# Patient Record
Sex: FEMALE
[De-identification: names, ages, dates, MRNs, and addresses within clinical notes are randomized per-mention and may not be internally consistent; named-entity substitution may affect disease eponyms.]

---

## 2017-03-03 RX ORDER — DESOGESTREL AND ETHINYL ESTRADIOL 0.15-0.03
KIT ORAL
Qty: 28 TABLET | Refills: 10 | Status: SHIPPED | OUTPATIENT
Start: 2017-03-03 | End: 2017-04-10

## 2017-04-10 RX ORDER — DESOGESTREL AND ETHINYL ESTRADIOL 0.15-0.03
KIT ORAL
Qty: 28 TABLET | Refills: 10 | Status: SHIPPED | OUTPATIENT
Start: 2017-04-10 | End: 2018-01-08

## 2017-05-09 NOTE — TELEPHONE ENCOUNTER
No future appointments. Due in May. LOV 11/16 recheck in 6 months. LAST LAB None    LAST RX   Sertraline HCl 100 MG Oral Tab 90 tablet 1 11/10/2016     Left message for patient to call back for an appointment.  When appointment made can give Yolanda 30

## 2017-05-10 NOTE — TELEPHONE ENCOUNTER
Future Appointments  Date Time Provider Kate Soliman   6/7/2017 9:00 AM Priscila Trujillo MD [facility] [address]         Pending Prescriptions Disp Refills    SERTRALINE  MG Oral Tab [Pharmacy Med Name: SERTRALINE 100MG TABLETS] 30 tablet 0

## 2017-05-17 RX ORDER — SERTRALINE HYDROCHLORIDE 100 MG/1
TABLET, FILM COATED ORAL
Qty: 30 TABLET | Refills: 0 | Status: SHIPPED | OUTPATIENT
Start: 2017-05-17 | End: 2017-06-16

## 2017-05-17 NOTE — TELEPHONE ENCOUNTER
Pt already has appt.     Future Appointments  Date Time Provider Kate Soliman   6/7/2017 9:00 AM Clay Rubi MD [facility] [address]

## 2017-06-13 RX ORDER — SERTRALINE HYDROCHLORIDE 100 MG/1
TABLET, FILM COATED ORAL
Qty: 30 TABLET | Refills: 0 | OUTPATIENT
Start: 2017-06-13

## 2017-06-13 NOTE — TELEPHONE ENCOUNTER
Last OV 11/10/16  No labs done    Last Rx 5/08/17 #30    Pt states she has enough to get to appt    Future Appointments  Date Time Provider Kate Soliman   6/16/2017 2:00 PM Amada Mascorro MD [facility] [address]

## 2017-06-16 NOTE — PROGRESS NOTES
Liliana Valdovinos IS A 32year old female [address] [facility] Patient presents with:  Medication Request: Sertraline        History of present illness:     Feels anxiety is stable. Left a short-term position to take new job after 3-4 weeks.      Temporary insurance now, new History   Marital Status: Single  Spouse Name: N/A    Years of Education: N/A  Number of Children: N/A     Occupational History    [employer]     Social History Main Topics   Smoking status: Never Smoker     Smokeless tobacco: Never Used    [employer]

## 2017-06-16 NOTE — PATIENT INSTRUCTIONS
Congrats on new job! 1 month sertraline ordered now, will extend for another 5-6 months once you call with details on health plan and location we can send rx. Recheck 6 months (late Dec, early January).

## 2017-07-05 ENCOUNTER — CHARTING TRANS (OUTPATIENT)
Dept: OTHER | Age: 27
End: 2017-07-05

## 2017-07-18 RX ORDER — SERTRALINE HYDROCHLORIDE 100 MG/1
TABLET, FILM COATED ORAL
Qty: 90 TABLET | Refills: 1 | Status: SHIPPED | OUTPATIENT
Start: 2017-07-18 | End: 2018-01-13

## 2017-07-18 NOTE — TELEPHONE ENCOUNTER
No future appointments. LOV 6/17   Recheck 6 months (late Dec, early January). LAST LAB    LAST RX   Sertraline HCl 100 MG Oral Tab 30 tablet 0 6/16/2017         Please advise. No protocol. If filled. Please close.    Thank You

## 2018-01-08 RX ORDER — DESOGESTREL AND ETHINYL ESTRADIOL 0.15-0.03
KIT ORAL
Qty: 28 TABLET | Refills: 0 | Status: SHIPPED | OUTPATIENT
Start: 2018-01-08 | End: 2018-01-09

## 2018-01-09 ENCOUNTER — OFFICE VISIT (OUTPATIENT)
Dept: OBGYN CLINIC | Facility: CLINIC | Age: 28
End: 2018-01-09

## 2018-01-09 VITALS
BODY MASS INDEX: 26.21 KG/M2 | DIASTOLIC BLOOD PRESSURE: 66 MMHG | SYSTOLIC BLOOD PRESSURE: 118 MMHG | HEART RATE: 80 BPM | HEIGHT: 67 IN | WEIGHT: 167 LBS

## 2018-01-09 DIAGNOSIS — Z01.419 ENCOUNTER FOR WELL WOMAN EXAM WITH ROUTINE GYNECOLOGICAL EXAM: Primary | ICD-10-CM

## 2018-01-09 PROCEDURE — 99395 PREV VISIT EST AGE 18-39: CPT | Performed by: OBSTETRICS & GYNECOLOGY

## 2018-01-09 PROCEDURE — 88175 CYTOPATH C/V AUTO FLUID REDO: CPT | Performed by: OBSTETRICS & GYNECOLOGY

## 2018-01-09 RX ORDER — DESOGESTREL AND ETHINYL ESTRADIOL 0.15-0.03
1 KIT ORAL DAILY
Qty: 3 PACKAGE | Refills: 4 | Status: SHIPPED | OUTPATIENT
Start: 2018-01-09 | End: 2018-09-25

## 2018-01-09 RX ORDER — DESOGESTREL AND ETHINYL ESTRADIOL 0.15-0.03
1 KIT ORAL DAILY
COMMUNITY
End: 2018-01-09

## 2018-01-09 NOTE — PROGRESS NOTES
Wayne Barrera is a 32year old female [facility] Patient's last menstrual period was 2017 (exact date). Patient presents with:  Wellness Visit  . no complaints    OBSTETRICS HISTORY:  OB History    Para Term  AB Living   0 0 0 0 0 0   SAB TAB Depression Mother    • kidney stones [OTHER] Paternal Grandmother    • Depression Maternal Aunt    • Allergies Sister      Gluten allergy       MEDICATIONS:    Current Outpatient Prescriptions:   •  Desogestrel-Ethinyl Estradiol (RECLIPSEN) 0.15-30 MG-MCG prolapse  Bladder:  No fullness, masses or tenderness  Vagina:  Normal appearance without lesions, no abnormal discharge  Cervix:  Normal without tenderness on motion  Uterus: normal in size, contour, position, mobility, without tenderness  Adnexa: normal

## 2018-01-15 NOTE — TELEPHONE ENCOUNTER
No future appointments. [facility] 6/17   Recheck 6 months (late Dec, early January).           LAST LAB None    LAST RX   SERTRALINE  MG Oral Tab 90 tablet 1 7/18/2017       Left detailed message for patient on phone. Needs to be seen for refill.  C

## 2018-01-19 RX ORDER — SERTRALINE HYDROCHLORIDE 100 MG/1
TABLET, FILM COATED ORAL
Qty: 30 TABLET | Refills: 0 | Status: SHIPPED | OUTPATIENT
Start: 2018-01-19 | End: 2018-02-12

## 2018-02-12 NOTE — PROGRESS NOTES
Columbus Rubinstein IS A 32year old female [address] [facility] Patient presents with: Anxiety: Room 4. 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 ANGELINA-7 is 1. Anxiety seems well-controlled.  Denies any depression or Caffeine Concern Yes    Comment: coffee 1 cup/day    Occupational Exposure No    Hobby Hazards No    Sleep Concern No    Stress Concern Yes    Comment: some with newer job    Weight Concern No    Special Diet No    Back Care No    Exercise Yes    Comment:

## 2018-03-05 RX ORDER — DESOGESTREL AND ETHINYL ESTRADIOL 0.15-0.03
KIT ORAL
Qty: 28 TABLET | Refills: 0 | Status: SHIPPED | OUTPATIENT
Start: 2018-03-05 | End: 2018-09-25

## 2018-03-14 RX ORDER — DESOGESTREL AND ETHINYL ESTRADIOL 0.15-0.03
KIT ORAL
Qty: 28 TABLET | Refills: 11 | Status: SHIPPED | OUTPATIENT
Start: 2018-03-14 | End: 2019-05-06

## 2018-08-10 NOTE — TELEPHONE ENCOUNTER
LOV 2/12/18    LAST LAB    LAST RX 2/12/18 x 6 months    Next OV No future appointments. PROTOCOL NONE    Pt now due for 6 months F/U    Left detailed message for pt to call and schedule medication F/U now due    Telephone Information:   Mobile [phone]       Dr Chastity Luque, please advise 30 day refill until pt can be seen?

## 2018-08-11 RX ORDER — SERTRALINE HYDROCHLORIDE 100 MG/1
TABLET, FILM COATED ORAL
Qty: 30 TABLET | Refills: 0 | Status: SHIPPED | OUTPATIENT
Start: 2018-08-11 | End: 2018-09-09

## 2018-09-10 RX ORDER — SERTRALINE HYDROCHLORIDE 100 MG/1
TABLET, FILM COATED ORAL
Qty: 15 TABLET | Refills: 0 | Status: SHIPPED | OUTPATIENT
Start: 2018-09-10 | End: 2018-09-25

## 2018-09-10 NOTE — TELEPHONE ENCOUNTER
LOV 2/12/18    LAST LAB    LAST RX 8/11/18 #30    Next OV   Future Appointments   Date Time Provider Kate Janny   9/25/2018  6:00 PM Deborah Thomas MD [facility]         Pt needs refill until upcoming appt

## 2018-09-10 NOTE — TELEPHONE ENCOUNTER
Gave rx #15, needs appt, was supposed to have been told last refill a month ago to set up appt. Please schedule within 2 weeks, 15 minutes appt.

## 2018-09-11 NOTE — TELEPHONE ENCOUNTER
Future Appointments   Date Time Provider Kate Soliman   9/25/2018  6:00 PM Joe Paulino MD [facility]

## 2018-09-25 RX ORDER — SERTRALINE HYDROCHLORIDE 100 MG/1
TABLET, FILM COATED ORAL
Qty: 15 TABLET | Refills: 0 | OUTPATIENT
Start: 2018-09-25

## 2018-09-25 NOTE — PROGRESS NOTES
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 [employer], second only to [employer].  Finally using inability: Not on file      Transportation needs - medical: Not on file      Transportation needs - non-medical: Not on file    Occupational History      Occupation:         Comment: [employer]    Tobacco Use      Smoking status: Never Smoker

## 2018-09-25 NOTE — TELEPHONE ENCOUNTER
LOV    LAST LAB    LAST RX   SERTRALINE  MG Oral Tab 15 tablet 0 9/10/2018    Sig :  TAKE 1 TABLET(100 MG) BY MOUTH EVERY DAY     Route:   (none)     Comment:   Told a month ago to make appt, please tell pt to schedule.            Next OV 9/25/2018

## 2018-11-03 VITALS
TEMPERATURE: 98.5 F | SYSTOLIC BLOOD PRESSURE: 116 MMHG | RESPIRATION RATE: 16 BRPM | HEART RATE: 76 BPM | HEIGHT: 67 IN | BODY MASS INDEX: 25.9 KG/M2 | WEIGHT: 165 LBS | DIASTOLIC BLOOD PRESSURE: 78 MMHG

## 2019-01-31 RX ORDER — DESOGESTREL AND ETHINYL ESTRADIOL 0.15-0.03
KIT ORAL
Qty: 84 TABLET | Refills: 0 | Status: SHIPPED | OUTPATIENT
Start: 2019-01-31 | End: 2019-04-18

## 2019-03-26 RX ORDER — SERTRALINE HYDROCHLORIDE 100 MG/1
TABLET, FILM COATED ORAL
Qty: 90 TABLET | Refills: 0 | OUTPATIENT
Start: 2019-03-26

## 2019-03-26 RX ORDER — SERTRALINE HYDROCHLORIDE 100 MG/1
TABLET, FILM COATED ORAL
Qty: 30 TABLET | Refills: 0 | Status: SHIPPED | OUTPATIENT
Start: 2019-03-26 | End: 2019-04-24

## 2019-03-26 NOTE — TELEPHONE ENCOUNTER
Name from pharmacy: SERTRALINE 100MG TABLETS          Will file in chart as: SERTRALINE  MG Oral Tab    Sig: TAKE 1 TABLET BY MOUTH EVERY DAY    Disp:  90 tablet    Refills:  0    Start: 3/26/2019    Class: Normal    To pharmacy: **Patient requests

## 2019-03-26 NOTE — TELEPHONE ENCOUNTER
LOV 9/25/18    LAST LAB    LAST RX 9/25/18 x 6 months    Next OV No future appointments. PROTOCOL  NONE      Left detailed message to please call back and schedule 6 month Depression F/U    Dr Tg Morrow, please advise 30 day refills until pt can be seen?

## 2019-04-19 RX ORDER — DESOGESTREL AND ETHINYL ESTRADIOL 0.15-0.03
KIT ORAL
Qty: 1 PACKAGE | Refills: 0 | OUTPATIENT
Start: 2019-04-19 | End: 2019-04-30

## 2019-04-19 RX ORDER — DESOGESTREL AND ETHINYL ESTRADIOL 0.15-0.03
KIT ORAL
Qty: 1 PACKAGE | Refills: 0 | Status: SHIPPED | OUTPATIENT
Start: 2019-04-19 | End: 2019-04-19

## 2019-04-24 RX ORDER — SERTRALINE HYDROCHLORIDE 100 MG/1
TABLET, FILM COATED ORAL
Qty: 15 TABLET | Refills: 0 | Status: SHIPPED | OUTPATIENT
Start: 2019-04-24 | End: 2019-04-30

## 2019-04-29 NOTE — TELEPHONE ENCOUNTER
Pt has appt scheduled    Future Appointments   Date Time Provider Kate Janny   4/30/2019  6:00 PM Maico Khan MD [facility]   5/6/2019  5:15 PM Светлана Bojorquez DO [facility]

## 2019-04-30 NOTE — PROGRESS NOTES
Lorena Toussaint IS A 25year old female [address] Patient presents with:  Depression: Med refills        History of present illness:     Has some challenges with keeping up with [place] activities.  ([address]) Supervises an athletic facility and outdoor         Comment: [employer]    Social Needs      Financial resource strain: Not on file      Food insecurity:        Worry: Not on file        Inability: Not on file      Transportation needs:        Medical: Not on file        Non-medical: Not on /78 (BP Location: Right arm, Patient Position: Sitting, Cuff Size: adult)   Pulse 68   Temp 98 °F (36.7 °C) (Oral)   Resp 16   Ht 67\"   Wt 166 lb   LMP 04/20/2019 (Approximate)   SpO2 98%   BMI 26.00 kg/m²      Lungs clear   Heart regular rhythm

## 2019-05-06 ENCOUNTER — OFFICE VISIT (OUTPATIENT)
Dept: OBGYN CLINIC | Facility: CLINIC | Age: 29
End: 2019-05-06
Payer: COMMERCIAL

## 2019-05-06 VITALS
BODY MASS INDEX: 25.9 KG/M2 | DIASTOLIC BLOOD PRESSURE: 72 MMHG | WEIGHT: 165 LBS | HEIGHT: 67 IN | HEART RATE: 68 BPM | SYSTOLIC BLOOD PRESSURE: 102 MMHG

## 2019-05-06 DIAGNOSIS — Z12.4 CERVICAL CANCER SCREENING: ICD-10-CM

## 2019-05-06 DIAGNOSIS — Z01.419 ENCOUNTER FOR WELL WOMAN EXAM WITH ROUTINE GYNECOLOGICAL EXAM: Primary | ICD-10-CM

## 2019-05-06 PROCEDURE — 87624 HPV HI-RISK TYP POOLED RSLT: CPT | Performed by: OBSTETRICS & GYNECOLOGY

## 2019-05-06 PROCEDURE — 88175 CYTOPATH C/V AUTO FLUID REDO: CPT | Performed by: OBSTETRICS & GYNECOLOGY

## 2019-05-06 PROCEDURE — 99395 PREV VISIT EST AGE 18-39: CPT | Performed by: OBSTETRICS & GYNECOLOGY

## 2019-05-06 RX ORDER — DESOGESTREL AND ETHINYL ESTRADIOL 0.15-0.03
1 KIT ORAL DAILY
Qty: 84 TABLET | Refills: 3 | Status: SHIPPED | OUTPATIENT
Start: 2019-05-06 | End: 2020-03-09

## 2019-05-06 NOTE — PROGRESS NOTES
Hodan May is a 25year old female [facility] Patient's last menstrual period was 2019 (approximate). Patient presents with:  Wellness Visit  . no complaints, would like to continue OCP    OBSTETRICS HISTORY:  OB History    Para Term  AB L phone: Not on file        Gets together: Not on file        Attends Catholic service: Not on file        Active member of club or organization: Not on file        Attends meetings of clubs or organizations: Not on file        Relationship status: Not on f diarrhea or constipation  Genitourinary:  denies dysuria, incontinence, abnormal vaginal discharge, vaginal itching  Musculoskeletal:  denies back pain. Skin/Breast:  Denies any breast pain, lumps, or discharge.    Neurological:  denies headaches, extremit

## 2019-11-02 RX ORDER — SERTRALINE HYDROCHLORIDE 100 MG/1
TABLET, FILM COATED ORAL
Qty: 30 TABLET | Refills: 0 | Status: SHIPPED | OUTPATIENT
Start: 2019-11-02 | End: 2019-12-01

## 2019-11-02 NOTE — TELEPHONE ENCOUNTER
Needs visit, I like to see patientst with depression/anxiety at least every 6 months. Schedule soon. 30 days sent.

## 2019-11-04 RX ORDER — SERTRALINE HYDROCHLORIDE 100 MG/1
TABLET, FILM COATED ORAL
Qty: 90 TABLET | Refills: 0 | OUTPATIENT
Start: 2019-11-04

## 2019-12-03 RX ORDER — SERTRALINE HYDROCHLORIDE 100 MG/1
TABLET, FILM COATED ORAL
Qty: 30 TABLET | Refills: 0 | Status: SHIPPED | OUTPATIENT
Start: 2019-12-03 | End: 2019-12-31

## 2019-12-03 NOTE — TELEPHONE ENCOUNTER
Overdue for followup (6 months was end of October), I'm giving one more 30 day refill but needs appt for further refills.

## 2019-12-03 NOTE — TELEPHONE ENCOUNTER
LOV 4/30/2019    LAST LAB  None    LAST RX    SERTRALINE  MG Oral Tab 30 tablet 0 11/2/2019         Next OV No future appointments. PROTOCOL None . 90 days with a refill on sertraline, should see you in 6 months for followup.

## 2019-12-31 RX ORDER — SERTRALINE HYDROCHLORIDE 100 MG/1
TABLET, FILM COATED ORAL
Qty: 16 TABLET | Refills: 0 | Status: SHIPPED | OUTPATIENT
Start: 2019-12-31 | End: 2020-01-14

## 2020-01-15 RX ORDER — SERTRALINE HYDROCHLORIDE 100 MG/1
TABLET, FILM COATED ORAL
Qty: 16 TABLET | Refills: 0 | OUTPATIENT
Start: 2020-01-15

## 2020-01-15 NOTE — TELEPHONE ENCOUNTER
LOV 1/14/2020    LAST LAB nONE    LAST RX  Sertraline HCl 100 MG Oral Tab 90 tablet 1 1/14/2020    Sig:   Take 1 tablet (100 mg total) by mouth once daily. Next OV No future appointments. PROTOCOL NONE    Refill not appropriate.  DENIED AS DUPL

## 2020-01-15 NOTE — PROGRESS NOTES
Rochelle Hall IS A 30year old female [address] [place] Patient presents with:  Medication Follow-Up       History of present illness:     Duties expanded last summer. Very physically active. Sleep & concentration ok. No panic.      Past history:     Past Medical status: Never Smoker      Smokeless tobacco: Never Used    Substance and Sexual Activity      Alcohol use: No        Alcohol/week: 0.0 standard drinks      Drug use: No      Sexual activity: Never        Birth control/protection: OCP    Lifestyle      Phys this visit:    Generalized anxiety disorder    Screening for deficiency anemia    Screening for diabetes mellitus    Screening, lipid    Screening for thyroid disorder          Patient Instructions   Schedule well visit in 4-6 months.

## 2020-03-09 RX ORDER — DESOGESTREL AND ETHINYL ESTRADIOL 0.15-0.03
KIT ORAL
Qty: 84 TABLET | Refills: 0 | Status: SHIPPED | OUTPATIENT
Start: 2020-03-09 | End: 2020-05-28

## 2020-05-28 RX ORDER — DESOGESTREL AND ETHINYL ESTRADIOL 0.15-0.03
KIT ORAL
Qty: 84 TABLET | Refills: 0 | Status: SHIPPED | OUTPATIENT
Start: 2020-05-28 | End: 2020-07-06

## 2020-07-06 ENCOUNTER — OFFICE VISIT (OUTPATIENT)
Dept: OBGYN CLINIC | Facility: CLINIC | Age: 30
End: 2020-07-06
Payer: COMMERCIAL

## 2020-07-06 VITALS
HEIGHT: 67 IN | BODY MASS INDEX: 26.37 KG/M2 | WEIGHT: 168 LBS | DIASTOLIC BLOOD PRESSURE: 56 MMHG | SYSTOLIC BLOOD PRESSURE: 112 MMHG

## 2020-07-06 DIAGNOSIS — Z12.4 CERVICAL CANCER SCREENING: ICD-10-CM

## 2020-07-06 DIAGNOSIS — Z01.419 ENCOUNTER FOR WELL WOMAN EXAM WITH ROUTINE GYNECOLOGICAL EXAM: Primary | ICD-10-CM

## 2020-07-06 PROCEDURE — 87624 HPV HI-RISK TYP POOLED RSLT: CPT | Performed by: OBSTETRICS & GYNECOLOGY

## 2020-07-06 PROCEDURE — 88175 CYTOPATH C/V AUTO FLUID REDO: CPT | Performed by: OBSTETRICS & GYNECOLOGY

## 2020-07-06 PROCEDURE — 99395 PREV VISIT EST AGE 18-39: CPT | Performed by: OBSTETRICS & GYNECOLOGY

## 2020-07-06 RX ORDER — DESOGESTREL AND ETHINYL ESTRADIOL 0.15-0.03
1 KIT ORAL DAILY
Qty: 84 TABLET | Refills: 3 | Status: SHIPPED | OUTPATIENT
Start: 2020-07-06 | End: 2021-04-26

## 2020-07-06 NOTE — PROGRESS NOTES
Jie Heart is a 26year old female [place] Patient's last menstrual period was 2020 (exact date). Patient presents with:  Wellness Visit  .   No complaints, would like to continue OCP  OBSTETRICS HISTORY:  OB History    Para Term  AB Myah George connections:        Talks on phone: Not on file        Gets together: Not on file        Attends Ca[employer] service: Not on file        Active member of club or organization: Not on file        Attends meetings of clubs or organizations: Not on file flashes  Eyes:  denies blurred or double vision  Cardiovascular:  denies chest pain or palpitations  Respiratory:  denies shortness of breath  Gastrointestinal:  denies heartburn, abdominal pain, diarrhea or constipation  Genitourinary:  denies dysuria, in B; Future    Other orders  -     Desogestrel-Ethinyl Estradiol (ISIBLOOM) 0.15-30 MG-MCG Oral Tab; Take 1 tablet by mouth daily.

## 2020-07-07 LAB — HPV I/H RISK 1 DNA SPEC QL NAA+PROBE: NEGATIVE

## 2020-08-24 NOTE — TELEPHONE ENCOUNTER
Needs repeat appt, could do video visit, she tends to delay followup appt for months. MA please check if she has enough until an appt and front please contact to schedule 20 minute video visit.

## 2020-08-26 NOTE — TELEPHONE ENCOUNTER
Future Appointments   Date Time Provider Kate Soliman   9/1/2020  5:00 PM Darlin Gomez MD [facility]

## 2020-08-27 RX ORDER — SERTRALINE HYDROCHLORIDE 100 MG/1
TABLET, FILM COATED ORAL
Qty: 30 TABLET | Refills: 0 | Status: SHIPPED | OUTPATIENT
Start: 2020-08-27 | End: 2020-09-01

## 2020-09-01 NOTE — PROGRESS NOTES
Iesha Jara IS A 26year old female [address] [facility] Patient presents with: Anxiety  Medication Follow-Up       History of present illness:     Doing ok with pandemic.   Working with [employer], since 7/8, home until then    Sleep ok, no panic, no mo education level: Not on file    Occupational History      Occupation:         Comment: [employer] resource strain: Not on file      Food insecurity:        Worry: Not on file        Inability: Not on file      Transp 112/82   Pulse 69   Temp 97.3 °F (36.3 °C) (Temporal)   Resp 16   Ht 67\"   Wt 164 lb (74.4 kg)   LMP 08/10/2020   SpO2 99%   BMI 25.69 kg/m²      Lungs clear   Heart regular rhythm  Affect normal      Test results:   None done.      Assessment & Plan:   Paulo Burciaga

## 2021-03-26 RX ORDER — SERTRALINE HYDROCHLORIDE 100 MG/1
TABLET, FILM COATED ORAL
Qty: 30 TABLET | Refills: 0 | Status: SHIPPED | OUTPATIENT
Start: 2021-03-26 | End: 2021-04-27

## 2021-04-21 ENCOUNTER — LAB ENCOUNTER (OUTPATIENT)
Dept: LAB | Age: 31
End: 2021-04-21
Payer: COMMERCIAL

## 2021-04-21 ENCOUNTER — TELEPHONE (OUTPATIENT)
Dept: FAMILY MEDICINE CLINIC | Facility: CLINIC | Age: 31
End: 2021-04-21

## 2021-04-21 DIAGNOSIS — Z13.0 SCREENING FOR DEFICIENCY ANEMIA: ICD-10-CM

## 2021-04-21 DIAGNOSIS — Z13.1 SCREENING FOR DIABETES MELLITUS: ICD-10-CM

## 2021-04-21 DIAGNOSIS — Z13.220 SCREENING, LIPID: Primary | ICD-10-CM

## 2021-04-21 DIAGNOSIS — Z00.00 LABORATORY EXAMINATION ORDERED AS PART OF A COMPLETE PHYSICAL EXAMINATION: ICD-10-CM

## 2021-04-21 DIAGNOSIS — Z13.29 SCREENING FOR THYROID DISORDER: ICD-10-CM

## 2021-04-21 DIAGNOSIS — Z13.220 SCREENING, LIPID: ICD-10-CM

## 2021-04-21 PROCEDURE — 80050 GENERAL HEALTH PANEL: CPT | Performed by: FAMILY MEDICINE

## 2021-04-21 PROCEDURE — 83036 HEMOGLOBIN GLYCOSYLATED A1C: CPT | Performed by: FAMILY MEDICINE

## 2021-04-21 PROCEDURE — 80061 LIPID PANEL: CPT | Performed by: FAMILY MEDICINE

## 2021-04-21 PROCEDURE — 84439 ASSAY OF FREE THYROXINE: CPT | Performed by: FAMILY MEDICINE

## 2021-04-26 RX ORDER — DESOGESTREL AND ETHINYL ESTRADIOL 0.15-0.03
KIT ORAL
Qty: 84 TABLET | Refills: 3 | Status: SHIPPED | OUTPATIENT
Start: 2021-04-26

## 2021-04-26 NOTE — TELEPHONE ENCOUNTER
LOV 9/1/20    LAST LAB 4/21/2021    LAST RX   SERTRALINE  MG Oral Tab 30 tablet 0 3/26/2021         Next OV   Future Appointments   Date Time Provider Kate Soliman   4/27/2021  4:40 PM Tom Spring MD [facility]         PROTOCOL NONE .

## 2021-04-27 RX ORDER — SERTRALINE HYDROCHLORIDE 100 MG/1
TABLET, FILM COATED ORAL
Qty: 90 TABLET | Refills: 1 | Status: SHIPPED | OUTPATIENT
Start: 2021-04-27 | End: 2021-10-28

## 2021-04-27 NOTE — PATIENT INSTRUCTIONS
continue sertraline same dose. For cholesterol, try to optimize diet first, recheck in 3 months, consider future cholesterol lowering medicine.  Benechol is a good margarine if you use that often, there are plant stanols (Cholestoff) a supplement over the · In place of regular cheese: Use fat-free or reduced-fat cheese. Abbie last reviewed this educational content on 5/1/2020  © 6284-6733 [employer]. All rights reserved.  This information is not intended as a substitute for professional

## 2021-04-27 NOTE — PROGRESS NOTES
Leanna Garcia IS A 26year old female [address] [facility] Patient presents with: Anxiety: medication f/u. History of present illness:     Pt works still with [employer]. More stressed lately. A little trouble with sleep. overthinking things.      Had Covi Occupational History      Occupation:         Comment: [employer]    Tobacco Use      Smoking status: Never Smoker      Smokeless tobacco: Never Used    Vaping Use      Vaping Use: Never used    Substance and Sexual Activity      Alcohol use: No 73   Temp 97.2 °F (36.2 °C) (Temporal)   Resp 16   Ht 5' 7\" (1.702 m)   Wt 174 lb (78.9 kg)   LMP 04/04/2021   SpO2 98%   BMI 27.25 kg/m²      Lungs clear   Heart regular rhythm no S3 S4 murmur  Abdomen normal nontender       Test results:   [facility] [place] Lymphocyte % 04/21/2021 34.0    • Monocyte % 04/21/2021 8.1    • Eosinophil % 04/21/2021 10.2    • Basophil % 04/21/2021 1.0    • Immature Granulocyte % 04/21/2021 0.5          Assessment & Plan:   Abdullahi Bedolla was seen today for anxiety.     Diagnoses and all orde last reviewed this educational content on 5/1/2020  © 4419-0925 [employer]. All rights reserved. This information is not intended as a substitute for professional medical care. Always follow your healthcare professional's instructions.

## 2021-10-29 RX ORDER — SERTRALINE HYDROCHLORIDE 100 MG/1
100 TABLET, FILM COATED ORAL DAILY
Qty: 90 TABLET | Refills: 1 | Status: SHIPPED | OUTPATIENT
Start: 2021-10-29

## 2021-10-29 NOTE — TELEPHONE ENCOUNTER
sertraline 100 MG Oral Tab         Sig: Take 1 tablet (100 mg total) by mouth daily. Disp:  90 tablet    Refills:  1    Start: 10/28/2021    Class: Normal     LOV  4/27/21 pector  Patient .     LAST LAB n/a     LAST RX  4/27/21 90 with 1      Next OV No

## 2022-02-02 RX ORDER — DESOGESTREL AND ETHINYL ESTRADIOL 0.15-0.03
KIT ORAL
Qty: 84 TABLET | Refills: 3 | OUTPATIENT
Start: 2022-02-02

## 2022-03-03 RX ORDER — DESOGESTREL AND ETHINYL ESTRADIOL 0.15-0.03
1 KIT ORAL DAILY
Qty: 84 TABLET | Refills: 0 | Status: SHIPPED | OUTPATIENT
Start: 2022-03-03 | End: 2022-05-09

## 2022-04-25 RX ORDER — SERTRALINE HYDROCHLORIDE 100 MG/1
TABLET, FILM COATED ORAL
Qty: 90 TABLET | Refills: 1 | Status: CANCELLED | OUTPATIENT
Start: 2022-04-25

## 2022-04-25 RX ORDER — SERTRALINE HYDROCHLORIDE 100 MG/1
TABLET, FILM COATED ORAL
Qty: 30 TABLET | Refills: 0 | OUTPATIENT
Start: 2022-04-25

## 2022-05-03 RX ORDER — SERTRALINE HYDROCHLORIDE 100 MG/1
100 TABLET, FILM COATED ORAL DAILY
Qty: 90 TABLET | Refills: 1 | Status: CANCELLED | OUTPATIENT
Start: 2022-05-03

## 2022-05-03 NOTE — TELEPHONE ENCOUNTER
Pt will be establishing care with a new PCP wants to know if she can get 1 month refill until she can schedule an appt

## 2022-05-04 RX ORDER — SERTRALINE HYDROCHLORIDE 100 MG/1
100 TABLET, FILM COATED ORAL DAILY
Qty: 30 TABLET | Refills: 0 | Status: SHIPPED | OUTPATIENT
Start: 2022-05-04

## 2022-05-10 RX ORDER — DESOGESTREL AND ETHINYL ESTRADIOL 0.15-0.03
1 KIT ORAL DAILY
Qty: 84 TABLET | Refills: 0 | Status: SHIPPED | OUTPATIENT
Start: 2022-05-10 | End: 2022-06-30

## 2022-06-01 DIAGNOSIS — F41.1 GENERALIZED ANXIETY DISORDER: ICD-10-CM

## 2022-06-01 RX ORDER — SERTRALINE HYDROCHLORIDE 100 MG/1
TABLET, FILM COATED ORAL
Qty: 30 TABLET | Refills: 0 | OUTPATIENT
Start: 2022-06-01

## 2022-06-01 NOTE — PATIENT INSTRUCTIONS
Continue your sertraline 100 mg one daily for your anxiety. Make an appointment in 3 months for your annual physical and repeat blood tests for follow up on your cholesterol.

## 2022-06-30 ENCOUNTER — OFFICE VISIT (OUTPATIENT)
Dept: OBGYN CLINIC | Facility: CLINIC | Age: 32
End: 2022-06-30
Payer: COMMERCIAL

## 2022-06-30 VITALS
BODY MASS INDEX: 27.21 KG/M2 | SYSTOLIC BLOOD PRESSURE: 118 MMHG | HEART RATE: 78 BPM | HEIGHT: 67 IN | DIASTOLIC BLOOD PRESSURE: 74 MMHG | WEIGHT: 173.38 LBS

## 2022-06-30 DIAGNOSIS — Z12.4 CERVICAL CANCER SCREENING: ICD-10-CM

## 2022-06-30 DIAGNOSIS — Z01.419 ENCOUNTER FOR WELL WOMAN EXAM WITH ROUTINE GYNECOLOGICAL EXAM: Primary | ICD-10-CM

## 2022-06-30 PROCEDURE — 3008F BODY MASS INDEX DOCD: CPT | Performed by: OBSTETRICS & GYNECOLOGY

## 2022-06-30 PROCEDURE — 3078F DIAST BP <80 MM HG: CPT | Performed by: OBSTETRICS & GYNECOLOGY

## 2022-06-30 PROCEDURE — 99395 PREV VISIT EST AGE 18-39: CPT | Performed by: OBSTETRICS & GYNECOLOGY

## 2022-06-30 PROCEDURE — 87624 HPV HI-RISK TYP POOLED RSLT: CPT | Performed by: OBSTETRICS & GYNECOLOGY

## 2022-06-30 PROCEDURE — 3074F SYST BP LT 130 MM HG: CPT | Performed by: OBSTETRICS & GYNECOLOGY

## 2022-06-30 RX ORDER — DESOGESTREL AND ETHINYL ESTRADIOL 0.15-0.03
1 KIT ORAL DAILY
Qty: 84 TABLET | Refills: 3 | Status: SHIPPED | OUTPATIENT
Start: 2022-06-30

## 2022-07-01 LAB — HPV I/H RISK 1 DNA SPEC QL NAA+PROBE: NEGATIVE

## 2022-07-18 ENCOUNTER — TELEPHONE (OUTPATIENT)
Dept: OBGYN CLINIC | Facility: CLINIC | Age: 32
End: 2022-07-18

## 2022-08-29 DIAGNOSIS — F41.1 GENERALIZED ANXIETY DISORDER: ICD-10-CM

## 2022-08-29 RX ORDER — SERTRALINE HYDROCHLORIDE 100 MG/1
TABLET, FILM COATED ORAL
Qty: 90 TABLET | Refills: 0 | Status: SHIPPED | OUTPATIENT
Start: 2022-08-29 | End: 2022-11-02

## 2022-08-29 NOTE — TELEPHONE ENCOUNTER
Please let the patient know this is the last refill for the Sertraline I will fill. Future refills need to come from her PCP. I see she has an appointment with Jeancarlos Ochoa on 9/26/2022. Her new PCP should provide further refills.

## 2023-03-27 ENCOUNTER — OFFICE VISIT (OUTPATIENT)
Dept: FAMILY MEDICINE CLINIC | Facility: CLINIC | Age: 33
End: 2023-03-27
Payer: COMMERCIAL

## 2023-03-27 VITALS
HEART RATE: 90 BPM | SYSTOLIC BLOOD PRESSURE: 126 MMHG | HEIGHT: 67 IN | BODY MASS INDEX: 26.84 KG/M2 | TEMPERATURE: 98 F | RESPIRATION RATE: 16 BRPM | DIASTOLIC BLOOD PRESSURE: 88 MMHG | WEIGHT: 171 LBS

## 2023-03-27 DIAGNOSIS — Z02.89 ENCOUNTER FOR COMPLETION OF FORM WITH PATIENT: Primary | ICD-10-CM

## 2023-03-27 DIAGNOSIS — F41.1 GENERALIZED ANXIETY DISORDER: ICD-10-CM

## 2023-03-27 DIAGNOSIS — F41.0 PANIC ATTACKS: ICD-10-CM

## 2023-03-27 RX ORDER — QUETIAPINE FUMARATE 25 MG/1
25 TABLET, FILM COATED ORAL DAILY
COMMUNITY
Start: 2023-03-15

## 2023-03-27 RX ORDER — PROPRANOLOL HYDROCHLORIDE 40 MG/1
40 TABLET ORAL 2 TIMES DAILY PRN
COMMUNITY
Start: 2023-03-17

## 2023-05-05 RX ORDER — DESOGESTREL AND ETHINYL ESTRADIOL 0.15-0.03
1 KIT ORAL DAILY
Qty: 84 TABLET | Refills: 0 | Status: SHIPPED | OUTPATIENT
Start: 2023-05-05

## 2023-05-27 DIAGNOSIS — F41.1 GENERALIZED ANXIETY DISORDER: ICD-10-CM

## 2023-05-31 RX ORDER — SERTRALINE HYDROCHLORIDE 100 MG/1
TABLET, FILM COATED ORAL
Qty: 90 TABLET | Refills: 0 | Status: SHIPPED | OUTPATIENT
Start: 2023-05-31

## 2023-05-31 NOTE — TELEPHONE ENCOUNTER
----- Message from Jaleesa Solorio sent at 2/1/2022  2:50 PM CST -----  Needs advice from nurse:      Who Called:pt  Regarding:ondansetron (ZOFRAN-ODT) 4 MG TbDL  See Instructions, 90 tab, 1, 1, Substitution Allowed, DISSOLVE ONE TABLET BY MOUTH EVERY 8 HOURS AS NEEDED FOR NAUSEA AND VOMITING      Would the patient rather a call back or VIA [facility]?  Best Call Back number:[phone]  Additional Info:[facility] [place], [place] - [address] (Ph: [phone])       SERTRALINE 100MG TABLETS    Please see pended medications. Please sign if appropriate.       Thank you      Last OV: 03/27/2023      Last refill: 11/02/2023 for 90/1 refill    Upcoming appt is scheduled for 06/29/2023

## 2023-07-13 ENCOUNTER — OFFICE VISIT (OUTPATIENT)
Facility: CLINIC | Age: 33
End: 2023-07-13
Payer: COMMERCIAL

## 2023-07-13 VITALS
BODY MASS INDEX: 27.37 KG/M2 | HEART RATE: 60 BPM | DIASTOLIC BLOOD PRESSURE: 78 MMHG | SYSTOLIC BLOOD PRESSURE: 116 MMHG | HEIGHT: 67 IN | WEIGHT: 174.38 LBS

## 2023-07-13 DIAGNOSIS — Z01.419 ENCOUNTER FOR WELL WOMAN EXAM WITH ROUTINE GYNECOLOGICAL EXAM: Primary | ICD-10-CM

## 2023-07-13 DIAGNOSIS — Z12.4 CERVICAL CANCER SCREENING: ICD-10-CM

## 2023-07-13 PROCEDURE — 3008F BODY MASS INDEX DOCD: CPT | Performed by: OBSTETRICS & GYNECOLOGY

## 2023-07-13 PROCEDURE — 87624 HPV HI-RISK TYP POOLED RSLT: CPT | Performed by: OBSTETRICS & GYNECOLOGY

## 2023-07-13 PROCEDURE — 99395 PREV VISIT EST AGE 18-39: CPT | Performed by: OBSTETRICS & GYNECOLOGY

## 2023-07-13 PROCEDURE — 3078F DIAST BP <80 MM HG: CPT | Performed by: OBSTETRICS & GYNECOLOGY

## 2023-07-13 PROCEDURE — 3074F SYST BP LT 130 MM HG: CPT | Performed by: OBSTETRICS & GYNECOLOGY

## 2023-07-13 RX ORDER — DESOGESTREL AND ETHINYL ESTRADIOL 0.15-0.03
1 KIT ORAL DAILY
Qty: 84 TABLET | Refills: 3 | Status: SHIPPED | OUTPATIENT
Start: 2023-07-13

## 2023-07-13 NOTE — PROGRESS NOTES
Emy Contreras is a 26year old female [facility] Patient's last menstrual period was 2023 (exact date). Patient presents with:  Wellness Visit: Last pap 22 neg  No concerns   . Patient has no complaints, would like to continue OCP    OBSTETRICS HISTORY:  OB History    Para Term  AB Living   0 0 0 0 0 0   SAB IAB Ectopic Multiple Live Births   0 0 0 0 0       GYNE HISTORY:  Periods regular monthly    Sexual activity:   Not Currently      Partners:   Male      Birth control/ protection:   OCP        Hx Prior Abnormal Pap: No  Pap Date: 22  Pap Result Notes: negative        MEDICAL HISTORY:  Past Medical History:   Diagnosis Date    Allergic rhinitis due to pollen     Anxiety state, unspecified     Depressive disorder, not elsewhere classified     Hyperlipidemia     Panic disorder without agoraphobia     Pap smear for cervical cancer screening 2014    negative       SURGICAL HISTORY:  Past Surgical History:   Procedure Laterality Date    Femur/knee surg unlisted  2010    R knee ACL repair    Femur/knee surg unlisted  2010    R knee scope for scar tissue    [place] teeth removed         SOCIAL HISTORY:  Social History    Socioeconomic History      Marital status: Single      Spouse name: Not on file      Number of children: Not on file      Years of education: Not on file      Highest education level: Not on file    Occupational History      Occupation:         Comment: [employer]    Tobacco Use      Smoking status: Never      Smokeless tobacco: Never    Vaping Use      Vaping Use: Never used    Substance and Sexual Activity      Alcohol use: No      Drug use: No      Sexual activity: Not Currently        Partners: Male        Birth control/protection: OCP    Other Topics      Concerns:         Service: No        Blood Transfusions: No        Caffeine Concern: No        Occupational Exposure: No        Hobby Hazards: No        Sleep Concern: No        Stress Concern: No        Weight Concern: Yes        Special Diet: No        Back Care: No        Exercise: No        Bike Helmet: No        Seat Belt: No        Self-Exams: No    Social History Narrative      Working full time at [employer] . Has a cat    Social Determinants of Health  Financial Resource Strain: Not on file  Food Insecurity: Not on file  Transportation Needs: Not on file  Physical Activity: Not on file  Stress: Not on file  Social Connections: Not on file  Housing Stability: Not on file    FAMILY HISTORY:  Family History   Problem Relation Age of Onset    Heart Attack Father         MI x 2, first was 45    Hypertension Father     Other (kidney stones) Father     Depression Mother     Other (kidney stones) Paternal Grandmother     Depression Maternal Aunt     Allergies Sister         Gluten allergy    No Known Problems Maternal Grandmother     No Known Problems Maternal Grandfather     No Known Problems Paternal Grandfather        MEDICATIONS:    Current Outpatient Medications:     Desogestrel-Ethinyl Estradiol (ISIBLOOM) 0.15-30 MG-MCG Oral Tab, Take 1 tablet by mouth daily. , Disp: 84 tablet, Rfl: 3    SERTRALINE 100 MG Oral Tab, TAKE 1 TABLET(100 MG) BY MOUTH EVERY EVENING, Disp: 90 tablet, Rfl: 0    ALLERGIES:    Dander                  ITCHING    Comment:cat      Review of Systems:  Constitutional:  Denies fatigue, night sweats, hot flashes  Eyes:  denies blurred or double vision  Cardiovascular:  denies chest pain or palpitations  Respiratory:  denies shortness of breath  Gastrointestinal:  denies heartburn, abdominal pain, diarrhea or constipation  Genitourinary:  denies dysuria, incontinence, abnormal vaginal discharge, vaginal itching  Musculoskeletal:  denies back pain. Skin/Breast:  Denies any breast pain, lumps, or discharge. Neurological:  denies headaches, extremity weakness or numbness. Psychiatric: denies depression or anxiety.   Endocrine:   denies excessive thirst or urination. Heme/Lymph:  denies history of anemia, easy bruising or bleeding. PHYSICAL EXAM:   Constitutional: well developed, well nourished  Head/Face: normocephalic  Neck/Thyroid: thyroid symmetric, no thyromegaly, no nodules, no adenopathy  Lymphatic:no abnormal supraclavicular or axillary adenopathy is noted  Breast: normal without palpable masses, tenderness, asymmetry, nipple discharge, nipple retraction or skin changes  Abdomen:  soft, nontender, nondistended, no masses  Skin/Hair: no unusual rashes or bruises  Extremities: no edema, no cyanosis  Psychiatric:  Oriented to time, place, person and situation. Appropriate mood and affect    Pelvic Exam:  External Genitalia: normal appearance, hair distribution, and no lesions  Urethral Meatus:  normal in size, location, without lesions and prolapse  Bladder:  No fullness, masses or tenderness  Vagina:  Normal appearance without lesions, no abnormal discharge  Cervix:  Normal without tenderness on motion  Uterus: normal in size, contour, position, mobility, without tenderness  Adnexa: normal without masses or tenderness  Perineum: normal  Anus: no hemorroids     Assessment & Plan:  Diagnoses and all orders for this visit:    Encounter for well woman exam with routine gynecological exam    Cervical cancer screening  -     HPV HIGH RISK , THIN PREP COLLECTION; Future  -     THINPREP PAP SMEAR B; Future    Other orders  -     Desogestrel-Ethinyl Estradiol (ISIBLOOM) 0.15-30 MG-MCG Oral Tab; Take 1 tablet by mouth daily.

## 2023-07-14 LAB — HPV I/H RISK 1 DNA SPEC QL NAA+PROBE: NEGATIVE

## 2024-01-27 DIAGNOSIS — F41.1 GENERALIZED ANXIETY DISORDER: ICD-10-CM

## 2024-01-27 DIAGNOSIS — F41.8 SITUATIONAL ANXIETY: ICD-10-CM

## 2024-01-28 PROBLEM — F33.2 MDD (MAJOR DEPRESSIVE DISORDER), RECURRENT EPISODE, SEVERE (HCC): Status: ACTIVE | Noted: 2024-01-28

## 2024-01-29 PROBLEM — F41.0 PANIC DISORDER WITHOUT AGORAPHOBIA WITH SEVERE PANIC ATTACKS: Status: ACTIVE | Noted: 2023-03-27

## 2024-01-29 RX ORDER — ALPRAZOLAM 0.25 MG/1
0.25 TABLET ORAL DAILY PRN
Qty: 15 TABLET | Refills: 0 | OUTPATIENT
Start: 2024-01-29

## 2024-01-29 NOTE — TELEPHONE ENCOUNTER
LOV: 8/21/23  Next OV: Follow-up for annual physical in the next 6 months.   Last Refill:8/21/23  Medication Quantity Refills Start End   ALPRAZolam 0.25 MG Oral Tab 15 tablet 0 8/21/2023 --   Sig:   Take 1 tablet (0.25 mg total) by mouth daily as needed for Sleep or Anxiety.       Pt has new PCP    Please authorize if acceptable.   Thank you!

## 2024-01-30 NOTE — TELEPHONE ENCOUNTER
Patient was in ER yesterday for SI and panic attack and was transferred to inpatient psych. Refill denied, will defer to psychiatry. Thank you.

## 2024-04-20 ENCOUNTER — E-VISIT (OUTPATIENT)
Dept: TELEHEALTH | Age: 34
End: 2024-04-20
Payer: COMMERCIAL

## 2024-04-20 ENCOUNTER — LAB ENCOUNTER (OUTPATIENT)
Dept: LAB | Age: 34
End: 2024-04-20
Attending: PHYSICIAN ASSISTANT
Payer: COMMERCIAL

## 2024-04-20 DIAGNOSIS — R39.9 UTI SYMPTOMS: ICD-10-CM

## 2024-04-20 DIAGNOSIS — R39.9 UTI SYMPTOMS: Primary | ICD-10-CM

## 2024-04-20 PROCEDURE — 87147 CULTURE TYPE IMMUNOLOGIC: CPT

## 2024-04-20 PROCEDURE — 87186 SC STD MICRODIL/AGAR DIL: CPT

## 2024-04-20 PROCEDURE — 87086 URINE CULTURE/COLONY COUNT: CPT

## 2024-04-20 RX ORDER — NITROFURANTOIN 25; 75 MG/1; MG/1
100 CAPSULE ORAL 2 TIMES DAILY
Qty: 10 CAPSULE | Refills: 0 | Status: SHIPPED | OUTPATIENT
Start: 2024-04-20 | End: 2024-04-25

## 2024-04-21 NOTE — PROGRESS NOTES
HPI:  Kenyatta Bullock is a 33 year old female who presents for an evisit.  See [employer] communications above.    Current Outpatient Medications   Medication Sig Dispense Refill    nitrofurantoin monohydrate macro (MACROBID) 100 MG Oral Cap Take 1 capsule (100 mg total) by mouth 2 (two) times daily for 5 days. 10 capsule 0    sertraline 100 MG Oral Tab Take 1 tablet (100 mg total) by mouth every evening. 90 tablet 1    ALPRAZolam 0.25 MG Oral Tab Take 1 tablet (0.25 mg total) by mouth daily as needed for Sleep or Anxiety. 15 tablet 0    Desogestrel-Ethinyl Estradiol (ISIBLOOM) 0.15-30 MG-MCG Oral Tab Take 1 tablet by mouth daily. 84 tablet 3     Past Medical History:    Allergic rhinitis due to pollen    Anxiety state, unspecified    Depressive disorder, not elsewhere classified    Hyperlipidemia    Panic disorder without agoraphobia    Pap smear for cervical cancer screening    negative     Past Surgical History:   Procedure Laterality Date    Femur/knee surg unlisted  6/2010    R knee ACL repair    Femur/knee surg unlisted  9/2010    R knee scope for scar tissue    [place] teeth removed         Social History     Socioeconomic History    Marital status: Single   Occupational History    Occupation:      Comment: [employer]   Tobacco Use    Smoking status: Never     Passive exposure: Never    Smokeless tobacco: Never   Vaping Use    Vaping status: Never Used   Substance and Sexual Activity    Alcohol use: No    Drug use: No    Sexual activity: Not Currently     Partners: Male     Birth control/protection: OCP   Other Topics Concern     Service No    Blood Transfusions No    Caffeine Concern No    Occupational Exposure No    Hobby Hazards No    Sleep Concern No    Stress Concern No    Weight Concern Yes    Special Diet No    Back Care No    Exercise No    Bike Helmet No    Seat Belt No    Self-Exams No   Social History Narrative    Working full time at [employer] . Has a cat           No results found for this or any previous visit (from the past 24 hour(s)).    ASSESSMENT AND PLAN:      ASSESSMENT:   Encounter Diagnosis   Name Primary?    UTI symptoms Yes       PLAN: Meds as below.  See patient Instructions  -Total of 16 minutes spent with patient.    Meds & Refills for this Visit:  Requested Prescriptions     Signed Prescriptions Disp Refills    nitrofurantoin monohydrate macro (MACROBID) 100 MG Oral Cap 10 capsule 0     Sig: Take 1 capsule (100 mg total) by mouth 2 (two) times daily for 5 days.       Risks, benefits, and side effects of medication explained and discussed.    There are no Patient Instructions on file for this visit.    The patient indicates understanding of these issues and agrees to the plan.  See attached patient references.  The patient is asked to return if sx's persist or worsen.    Kenyatta Bullock understands evisit evaluation is not a substitute for face-to-face examination or emergency care. Patient advised to go to ER or call 911 for worsening symptoms or acute distress.

## 2024-04-24 RX ORDER — DESOGESTREL AND ETHINYL ESTRADIOL 0.15-0.03
1 KIT ORAL DAILY
Qty: 84 TABLET | Refills: 0 | Status: SHIPPED | OUTPATIENT
Start: 2024-04-24 | End: 2024-07-08

## 2024-07-16 ENCOUNTER — TELEPHONE (OUTPATIENT)
Facility: CLINIC | Age: 34
End: 2024-07-16

## 2024-09-13 ENCOUNTER — V-VISIT (OUTPATIENT)
Dept: URGENT CARE | Age: 34
End: 2024-09-13

## 2024-09-13 ENCOUNTER — TELEPHONE (OUTPATIENT)
Dept: OTHER | Age: 34
End: 2024-09-13

## 2024-09-13 VITALS
RESPIRATION RATE: 18 BRPM | BODY MASS INDEX: 25.9 KG/M2 | HEIGHT: 67 IN | SYSTOLIC BLOOD PRESSURE: 121 MMHG | HEART RATE: 90 BPM | WEIGHT: 165 LBS | TEMPERATURE: 97 F | DIASTOLIC BLOOD PRESSURE: 81 MMHG | OXYGEN SATURATION: 98 %

## 2024-09-13 DIAGNOSIS — H65.03 BILATERAL ACUTE SEROUS OTITIS MEDIA, RECURRENCE NOT SPECIFIED: Primary | ICD-10-CM

## 2024-09-13 RX ORDER — DESOGESTREL AND ETHINYL ESTRADIOL 0.15-0.03
1 KIT ORAL DAILY
COMMUNITY
Start: 2024-07-08

## 2024-09-13 RX ORDER — CLONAZEPAM 0.5 MG/1
TABLET ORAL
COMMUNITY
Start: 2024-09-09

## 2024-09-13 RX ORDER — PSEUDOEPHEDRINE HCL 30 MG
30 TABLET ORAL EVERY 6 HOURS PRN
COMMUNITY
Start: 2024-09-13

## 2024-09-13 RX ORDER — SERTRALINE HYDROCHLORIDE 20 MG/ML
SOLUTION ORAL
COMMUNITY

## 2024-09-13 ASSESSMENT — ENCOUNTER SYMPTOMS
CHILLS: 0
FATIGUE: 0
RHINORRHEA: 0
VOMITING: 0
FEVER: 0
DIZZINESS: 1
ABDOMINAL PAIN: 0
DIAPHORESIS: 0
DIARRHEA: 0
COUGH: 0
HEADACHES: 1
SINUS PAIN: 0
SINUS PRESSURE: 0
SORE THROAT: 0

## 2024-09-19 RX ORDER — DESOGESTREL AND ETHINYL ESTRADIOL 0.15-0.03
1 KIT ORAL DAILY
Qty: 84 TABLET | Refills: 0 | Status: SHIPPED | OUTPATIENT
Start: 2024-09-19 | End: 2024-10-23

## 2024-10-23 ENCOUNTER — OFFICE VISIT (OUTPATIENT)
Facility: CLINIC | Age: 34
End: 2024-10-23
Payer: COMMERCIAL

## 2024-10-23 VITALS
SYSTOLIC BLOOD PRESSURE: 118 MMHG | DIASTOLIC BLOOD PRESSURE: 64 MMHG | HEIGHT: 67 IN | HEART RATE: 87 BPM | BODY MASS INDEX: 26.84 KG/M2 | WEIGHT: 171 LBS

## 2024-10-23 DIAGNOSIS — Z01.419 ENCOUNTER FOR WELL WOMAN EXAM WITH ROUTINE GYNECOLOGICAL EXAM: Primary | ICD-10-CM

## 2024-10-23 DIAGNOSIS — N76.0 VAGINITIS AND VULVOVAGINITIS: ICD-10-CM

## 2024-10-23 DIAGNOSIS — Z12.4 CERVICAL CANCER SCREENING: ICD-10-CM

## 2024-10-23 PROCEDURE — 3078F DIAST BP <80 MM HG: CPT | Performed by: OBSTETRICS & GYNECOLOGY

## 2024-10-23 PROCEDURE — 81514 NFCT DS BV&VAGINITIS DNA ALG: CPT | Performed by: OBSTETRICS & GYNECOLOGY

## 2024-10-23 PROCEDURE — 3074F SYST BP LT 130 MM HG: CPT | Performed by: OBSTETRICS & GYNECOLOGY

## 2024-10-23 PROCEDURE — 3008F BODY MASS INDEX DOCD: CPT | Performed by: OBSTETRICS & GYNECOLOGY

## 2024-10-23 PROCEDURE — 87624 HPV HI-RISK TYP POOLED RSLT: CPT | Performed by: OBSTETRICS & GYNECOLOGY

## 2024-10-23 PROCEDURE — 99395 PREV VISIT EST AGE 18-39: CPT | Performed by: OBSTETRICS & GYNECOLOGY

## 2024-10-23 RX ORDER — TERCONAZOLE 0.4 %
1 CREAM WITH APPLICATOR VAGINAL NIGHTLY
Qty: 45 G | Refills: 0 | Status: SHIPPED | OUTPATIENT
Start: 2024-10-23

## 2024-10-23 RX ORDER — DESOGESTREL AND ETHINYL ESTRADIOL 0.15-0.03
1 KIT ORAL DAILY
Qty: 84 TABLET | Refills: 3 | Status: SHIPPED | OUTPATIENT
Start: 2024-10-23

## 2024-10-23 NOTE — PROGRESS NOTES
Kenyatta Bullock is a 34 year old female  Patient's last menstrual period was 2024 (exact date).   Chief Complaint   Patient presents with    Wellness Visit     Last pap smear was 23, negative     Contraception     Refill needed, pharmacy confirmed.   .Patient c/o vaginal itching, would like to continue OCP, declines STD check    OBSTETRICS HISTORY:  OB History    Para Term  AB Living   0 0 0 0 0 0   SAB IAB Ectopic Multiple Live Births   0 0 0 0 0       GYNE HISTORY:  Periods regular monthly    History   Sexual Activity    Sexual activity: Not Currently    Partners: Male    Birth control/ protection: OCP        Hx Prior Abnormal Pap: No  Pap Date: 23  Pap Result Notes: negative        MEDICAL HISTORY:  Past Medical History:    Allergic rhinitis due to pollen    Anxiety state, unspecified    Depressive disorder, not elsewhere classified    Hyperlipidemia    Panic disorder without agoraphobia    Pap smear for cervical cancer screening    negative       SURGICAL HISTORY:  Past Surgical History:   Procedure Laterality Date    Femur/knee surg unlisted  2010    R knee ACL repair    Femur/knee surg unlisted  2010    R knee scope for scar tissue    [place] teeth removed         SOCIAL HISTORY:  Social History     Socioeconomic History    Marital status: Single     Spouse name: Not on file    Number of children: Not on file    Years of education: Not on file    Highest education level: Not on file   Occupational History    Occupation:      Comment: [employer]   Tobacco Use    Smoking status: Never     Passive exposure: Never    Smokeless tobacco: Never   Vaping Use    Vaping status: Never Used   Substance and Sexual Activity    Alcohol use: No    Drug use: No    Sexual activity: Not Currently     Partners: Male     Birth control/protection: OCP   Other Topics Concern     Service No    Blood Transfusions No    Caffeine Concern No    Occupational Exposure No    Hobby  Hazards No    Sleep Concern No    Stress Concern No    Weight Concern Yes    Special Diet No    Back Care No    Exercise No    Bike Helmet No    Seat Belt No    Self-Exams No   Social History Narrative    Working full time at [employer] . Has a cat     Social Drivers of Health     Financial Resource Strain: Not on file   Food Insecurity: Not on file   Transportation Needs: Not on file   Physical Activity: Not on file   Stress: Not on file   Social Connections: Not on file   Housing Stability: Not on file       FAMILY HISTORY:  Family History   Problem Relation Age of Onset    Heart Attack Father         MI x 2, first was 38    Hypertension Father     Other (kidney stones) Father     Depression Mother     Anxiety Mother     No Known Problems Maternal Grandmother     No Known Problems Maternal Grandfather     Other (kidney stones) Paternal Grandmother     No Known Problems Paternal Grandfather     Allergies Sister         Gluten allergy    Depression Maternal Aunt        MEDICATIONS:    Current Outpatient Medications:     teraconazole 0.4 % Vaginal Cream, Place 1 applicator vaginally nightly., Disp: 45 g, Rfl: 0    Desogestrel-Ethinyl Estradiol (ISIBLOOM) 0.15-30 MG-MCG Oral Tab, Take 1 tablet by mouth daily., Disp: 84 tablet, Rfl: 3    sertraline 100 MG Oral Tab, Take 1 tablet (100 mg total) by mouth every evening., Disp: 90 tablet, Rfl: 1    ALPRAZolam 0.25 MG Oral Tab, Take 1 tablet (0.25 mg total) by mouth daily as needed for Sleep or Anxiety. (Patient not taking: Reported on 10/23/2024), Disp: 15 tablet, Rfl: 0    ALLERGIES:  Allergies[1]      Review of Systems:  Constitutional:  Denies fatigue, night sweats, hot flashes  Eyes:  denies blurred or double vision  Cardiovascular:  denies chest pain or palpitations  Respiratory:  denies shortness of breath  Gastrointestinal:  denies heartburn, abdominal pain, diarrhea or constipation  Genitourinary:  denies dysuria, incontinence, abnormal  vaginal discharge, vaginal itching  Musculoskeletal:  denies back pain.  Skin/Breast:  Denies any breast pain, lumps, or discharge.   Neurological:  denies headaches, extremity weakness or numbness.  Psychiatric: denies depression or anxiety.  Endocrine:   denies excessive thirst or urination.  Heme/Lymph:  denies history of anemia, easy bruising or bleeding.      PHYSICAL EXAM:   Constitutional: well developed, well nourished  Head/Face: normocephalic  Neck/Thyroid: thyroid symmetric, no thyromegaly, no nodules, no adenopathy  Lymphatic:no abnormal supraclavicular or axillary adenopathy is noted  Breast: normal without palpable masses, tenderness, asymmetry, nipple discharge, nipple retraction or skin changes  Abdomen:  soft, nontender, nondistended, no masses  Skin/Hair: no unusual rashes or bruises  Extremities: no edema, no cyanosis  Psychiatric:  Oriented to time, place, person and situation. Appropriate mood and affect    Pelvic Exam:  External Genitalia: normal appearance, hair distribution, and no lesions  Urethral Meatus:  normal in size, location, without lesions and prolapse  Bladder:  No fullness, masses or tenderness  Vagina:  Normal appearance without lesions, copious white off  discharge  Cervix:  Normal without tenderness on motion  Uterus: normal in size, contour, position, mobility, without tenderness  Adnexa: normal without masses or tenderness  Perineum: normal  Anus: no hemorroids     Assessment & Plan:  Diagnoses and all orders for this visit:    Encounter for well woman exam with routine gynecological exam    Cervical cancer screening  -     ThinPrep PAP Smear B; Future    Vaginitis and vulvovaginitis  -     Vaginitis Vaginosis PCR Panel; Future  -     Hpv High Risk , Thin Prep Collect; Future    Other orders  -     teraconazole 0.4 % Vaginal Cream; Place 1 applicator vaginally nightly.  -     Desogestrel-Ethinyl Estradiol (ISIBLOOM) 0.15-30 MG-MCG Oral Tab; Take 1 tablet by mouth  daily.                   [1]   Allergies  Allergen Reactions    Dander ITCHING     cat

## 2024-10-24 LAB
BV BACTERIA DNA VAG QL NAA+PROBE: NEGATIVE
C GLABRATA DNA VAG QL NAA+PROBE: NEGATIVE
C KRUSEI DNA VAG QL NAA+PROBE: NEGATIVE
CANDIDA DNA VAG QL NAA+PROBE: POSITIVE
HPV E6+E7 MRNA CVX QL NAA+PROBE: NEGATIVE
T VAGINALIS DNA VAG QL NAA+PROBE: NEGATIVE

## 2024-10-28 NOTE — PROGRESS NOTES
Results & recommendations to continue Terazol left on voicemail per HIPAA form. To call with any questions.

## 2024-10-30 LAB
.: NORMAL
.: NORMAL

## (undated) NOTE — MR AVS SNAPSHOT
[facility] [address] [place] [id number] [phone]               Thank you for choosing us for your health care visit with Abhishek Noble MD.  We are glad to serve you and happy to provide you with this visit,  view other health information, and more. To sign up or find more information, go to [email or web address] and click on the Sign Up Now link in the [employer] box.      Enter your [employer] Activation Code exactly as it appears below along with yo Don’t forget strength training with weights and resistance Set goals and track your progress   You don’t need to join a gym. Home exercises work great.  Put more priority on exercise in your life                    Visit [facility] online at

## (undated) NOTE — MR AVS SNAPSHOT
After Visit Summary   7/6/2020    Jacki Bell    MRN: [id number]           Visit Information     Date & Time  7/6/2020  4:00 PM Provider  Shayla Alvarez  [place]  [address]  Dept.  Phone  [address] [facility] If you receive a survey from [employer], please take a few minutes to complete it and provide feedback. We strive to deliver the best patient experience and are looking for ways to make improvements. Your feedback will help us do so.  For more information EMERGENCY ROOM Life-threatening emergencies needing immediate intervention at a hospital emergency room.  Average cost  $2,300*   *Cost varies based on your insurance coverage  For more information about hours, locations or appointment options available at

## (undated) NOTE — LETTER
02/17/20        [address] [place] [address]      Dear Don Domingo,    [facility] records indicate that you have outstanding lab work and or testing that was ordered for you and has not yet been completed:  Orders Placed This Encounter      STEW W